# Patient Record
(demographics unavailable — no encounter records)

---

## 2025-03-19 NOTE — HISTORY OF PRESENT ILLNESS
[de-identified] : 03/19/2025: 64 y/o RHD male, presents today for right shoulder pain. States been going on for about a year, no specific injury.  He reports worse pain with activity and reaching overhead.  Pain is now intermittent. He feels pain deep and into the arm.  He tried prednisone with mild.  No injecitons.  PMHx:  HTN  MRI R shoulder (ZP): GH DJD, mild PRCT, bursisit

## 2025-03-19 NOTE — PHYSICAL EXAM
[Right] : right shoulder [5 ___] : forward flexion 5[unfilled]/5 [5___] : external rotation 5[unfilled]/5 [] : no ecchymosis [FreeTextEntry9] : FE: 160 ER: 60

## 2025-03-19 NOTE — ASSESSMENT
[FreeTextEntry1] : MRI R shoulder (ZP): GH DJD, mild PRCT, bursitis. Xrays and advanced imaging reviewed. Discussed op versus non op tx, including the r/b/a/c of both. Discussed timing, frequency and efficacy of cortisone injections. Discussed risks of repeated cortisone injections.  Discussed trial of visco supplementation. He will take OTC NSAIDs.  RTO prn.

## 2025-03-19 NOTE — DATA REVIEWED
[MRI] : MRI [Right] : of the right [Shoulder] : shoulder [I independently reviewed and interpreted images and report] : I independently reviewed and interpreted images and report [FreeTextEntry1] : MRI R shoulder (ZP): GH DJD, mild PRCT, bursitis.